# Patient Record
Sex: MALE | Race: OTHER | HISPANIC OR LATINO
[De-identification: names, ages, dates, MRNs, and addresses within clinical notes are randomized per-mention and may not be internally consistent; named-entity substitution may affect disease eponyms.]

---

## 2021-03-22 ENCOUNTER — APPOINTMENT (OUTPATIENT)
Age: 26
End: 2021-03-22
Payer: COMMERCIAL

## 2021-03-22 PROCEDURE — 0001A: CPT

## 2021-04-07 ENCOUNTER — EMERGENCY (EMERGENCY)
Facility: HOSPITAL | Age: 26
LOS: 1 days | Discharge: HOME | End: 2021-04-07
Attending: EMERGENCY MEDICINE
Payer: COMMERCIAL

## 2021-04-07 VITALS
OXYGEN SATURATION: 99 % | SYSTOLIC BLOOD PRESSURE: 125 MMHG | RESPIRATION RATE: 18 BRPM | DIASTOLIC BLOOD PRESSURE: 74 MMHG | HEART RATE: 70 BPM

## 2021-04-07 VITALS
HEIGHT: 71 IN | DIASTOLIC BLOOD PRESSURE: 73 MMHG | WEIGHT: 175.05 LBS | HEART RATE: 82 BPM | TEMPERATURE: 98 F | RESPIRATION RATE: 18 BRPM | OXYGEN SATURATION: 99 % | SYSTOLIC BLOOD PRESSURE: 124 MMHG

## 2021-04-07 PROCEDURE — 99284 EMERGENCY DEPT VISIT MOD MDM: CPT

## 2021-04-07 RX ORDER — CYCLOBENZAPRINE HYDROCHLORIDE 10 MG/1
5 TABLET, FILM COATED ORAL ONCE
Refills: 0 | Status: COMPLETED | OUTPATIENT
Start: 2021-04-07 | End: 2021-04-07

## 2021-04-07 RX ORDER — ACETAMINOPHEN 500 MG
975 TABLET ORAL ONCE
Refills: 0 | Status: COMPLETED | OUTPATIENT
Start: 2021-04-07 | End: 2021-04-07

## 2021-04-07 RX ORDER — IBUPROFEN 200 MG
600 TABLET ORAL ONCE
Refills: 0 | Status: COMPLETED | OUTPATIENT
Start: 2021-04-07 | End: 2021-04-07

## 2021-04-07 RX ORDER — CYCLOBENZAPRINE HYDROCHLORIDE 10 MG/1
1 TABLET, FILM COATED ORAL
Qty: 20 | Refills: 0
Start: 2021-04-07

## 2021-04-07 RX ADMIN — CYCLOBENZAPRINE HYDROCHLORIDE 5 MILLIGRAM(S): 10 TABLET, FILM COATED ORAL at 10:37

## 2021-04-07 RX ADMIN — Medication 975 MILLIGRAM(S): at 10:37

## 2021-04-07 RX ADMIN — Medication 975 MILLIGRAM(S): at 11:00

## 2021-04-07 RX ADMIN — Medication 600 MILLIGRAM(S): at 10:38

## 2021-04-07 RX ADMIN — Medication 600 MILLIGRAM(S): at 11:00

## 2021-04-07 NOTE — ED PROVIDER NOTE - NSFOLLOWUPINSTRUCTIONS_ED_ALL_ED_FT
Tylenol (acetaminophen) two tablets every 4-6 hours or Motrin (Ibuprofen) 2-3 tabs every 6-8 hours if needed.  Apply ice to area 20 minutes on area every 2-4 hours for the next 48-72 hours.  Activity as tolerated  Call [facility] - [phone] for further evaluation and management of your back pain if lasting more than 4-5 days.

## 2021-04-07 NOTE — ED PROVIDER NOTE - PHYSICAL EXAMINATION
Attn - alert, nad, back - no lesions or rashes. tender R para lumbar, no midline tenderness.  SLR neg, abdo -, no CVAT, DTRs +2/4,  =, no clonus or foot drop.  motor 5/5, =

## 2021-04-07 NOTE — ED PROVIDER NOTE - PATIENT PORTAL LINK FT
You can access the FollowMyHealth Patient Portal offered by [facility] by registering at the following website: [email or web address]. By joining [facility]’s FollowMyHealth portal, you will also be able to view your health information using other applications (apps) compatible with our system.

## 2021-04-07 NOTE — ED ADULT NURSE NOTE - OBJECTIVE STATEMENT
26 y/o male Maltese speaking, understands/ speaks some English,  phone provided by MD, pt  c/o right lower back pain after lifting a TV weighing 75 lbs at 3am today.  Pain reports pain is non radiation and he did not take any meds.   Pt reports having pain in his right  lower back last month lasting one day.   Pt denies numbness, tingling, weakness, fever, chills.  Respiration easy and unlabored.  Extremities mobile.  No acute respiratory distress noted.      #[id number]

## 2021-04-07 NOTE — ED PROVIDER NOTE - CLINICAL SUMMARY MEDICAL DECISION MAKING FREE TEXT BOX
Problem: Patient Care Overview (Adult)  Goal: Plan of Care Review  Outcome: Ongoing (interventions implemented as appropriate)    08/14/17 1208   Coping/Psychosocial Response Interventions   Plan Of Care Reviewed With patient   Patient Care Overview   Progress no change   Outcome Evaluation   Outcome Summary/Follow up Plan pt tolerated procedure well.            Attn - back strain in R after lifting 75 lbs TV this am - NSAIDs, Tylenol, flexeril

## 2021-04-07 NOTE — ED PROVIDER NOTE - OBJECTIVE STATEMENT
Attn- pt seen in [facility] - c/o R lower back pain after lifting 75 lbs TV at 3am today.  no radiation. no Red flags for back pain.  did not take any meds.  had pain in R lower back last month that lasted one day. Attn- pt seen in [facility] - c/o R lower back pain after lifting 75 lbs TV at 3am today.  no radiation. no Red flags for back pain.  did not take any meds.  had pain in R lower back last month that lasted one day.   used #[id number]

## 2021-04-08 PROBLEM — Z00.00 ENCOUNTER FOR PREVENTIVE HEALTH EXAMINATION: Status: ACTIVE | Noted: 2021-04-08

## 2021-04-09 PROBLEM — Z78.9 OTHER SPECIFIED HEALTH STATUS: Chronic | Status: ACTIVE | Noted: 2021-04-07

## 2021-04-19 ENCOUNTER — APPOINTMENT (OUTPATIENT)
Dept: POPULATION HEALTH | Facility: CLINIC | Age: 26
End: 2021-04-19
Payer: OTHER MISCELLANEOUS

## 2021-04-19 VITALS
OXYGEN SATURATION: 96 % | HEART RATE: 98 BPM | DIASTOLIC BLOOD PRESSURE: 76 MMHG | BODY MASS INDEX: 41.32 KG/M2 | SYSTOLIC BLOOD PRESSURE: 130 MMHG | WEIGHT: 279 LBS | RESPIRATION RATE: 16 BRPM | TEMPERATURE: 98.6 F | HEIGHT: 69 IN

## 2021-04-19 DIAGNOSIS — S39.012A STRAIN OF MUSCLE, FASCIA AND TENDON OF LOWER BACK, INITIAL ENCOUNTER: ICD-10-CM

## 2021-04-19 DIAGNOSIS — S90.31XA CONTUSION OF RIGHT FOOT, INITIAL ENCOUNTER: ICD-10-CM

## 2021-04-19 DIAGNOSIS — Z78.9 OTHER SPECIFIED HEALTH STATUS: ICD-10-CM

## 2021-04-19 PROCEDURE — 99202 OFFICE O/P NEW SF 15 MIN: CPT

## 2021-04-19 PROCEDURE — 99072 ADDL SUPL MATRL&STAF TM PHE: CPT

## 2021-04-21 NOTE — PHYSICAL EXAM
[General Appearance - Alert] : alert [General Appearance - In No Acute Distress] : in no acute distress [General Appearance - Well Nourished] : well nourished [General Appearance - Well Developed] : well developed [General Appearance - Well-Appearing] : healthy appearing [Neck Appearance] : the appearance of the neck was normal [Neck Cervical Mass (___cm)] : no neck mass was observed [Jugular Venous Distention Increased] : there was no jugular-venous distention [Respiration, Rhythm And Depth] : normal respiratory rhythm and effort [Exaggerated Use Of Accessory Muscles For Inspiration] : no accessory muscle use [Heart Rate And Rhythm] : heart rate was normal and rhythm regular [Abnormal Walk] : normal gait [Skin Color & Pigmentation] : normal skin color and pigmentation [Skin Turgor] : normal skin turgor [] : no rash [Skin Lesions] : no skin lesions [Deep Tendon Reflexes (DTR)] : deep tendon reflexes were 2+ and symmetric [Sensation] : the sensory exam was normal to light touch and pinprick [Motor Exam] : the motor exam was normal [Oriented To Time, Place, And Person] : oriented to person, place, and time [Impaired Insight] : insight and judgment were intact [Affect] : the affect was normal [Mood] : the mood was normal [FreeTextEntry1] : Shoulders: FROM w/o pain, no ttp.  No swelling, bruising, or bony prominence appreciated.

## 2021-04-21 NOTE — REVIEW OF SYSTEMS
[As Noted in HPI] : as noted in HPI [Negative] : Neurological [Fever] : no fever [Chills] : no chills

## 2021-04-21 NOTE — ASSESSMENT
[Indicate if, in your opinion, the incident that the patient described was the competent medical cause of this injury/illness.] : The incident that the patient described was the competent medical cause of this injury/illness: Yes [Indicate if the patient's complaints are consistent with his/her history of the injury/illness.] : Indicate if the patient's complaints are consistent with his/her history of the injury/illness: Yes [Yes] : Yes, it is consistent [Are there any work limitations? (If so, explain and quantify, including the anticipated duration of the limitations)] : There are work limitations. [Can the patient return to usual work activities as indicated? If yes, indicate date___] : The patient cannot return to usual work activities as indicated. [FreeTextEntry5] : 50 [FreeTextEntry6] : Pt continues to have low back pain with aggravated motions of the back and with lifting, less pain with rest.  Pt has yet to initiate prescribed medications, one of which can cause potential drowsiness, on a consistent basis.   [FreeTextEntry7] : Suggested no lifting > 15 lbs for 2-4 weeks then re-evaluate.

## 2021-04-21 NOTE — PLAN
[FreeTextEntry1] : -Discussed injury and potential course of natural recovery.\par -Initiate cyclobenzaprine 5mg tabs, take 2 tabs at bedtime prn muscle spasm.  Discussed medication indication, CI, and potential SEs.  Precautions given and discussed in not taking this medication before and while operating heavy machinery.\par -Ibuprofen 200mg tabs, take 3 tabs by mouth every 6 hours prn pain.  Discussed medication indication, CI, and potential SEs.  \par -Tylenol 500mg tabs, take 2 tabs by mouth twice daily prn pain.  Discussed medication indication, CI, and potential SEs.  \par -May apply cold/hot compress to back as for comfort.\par -Discussed work status.  Off work today and tomorrow to initiate medications.  Return to work on 4/21/2021 with restriction of no lifting > 15 lbs, no extensive bending/twisting, and no prolonged sitting/standing for more than 2 hours at a time.\par -Precautions given as to activity that may aggravate injury outside of work.\par -Offered physical therapy.  Pt declines at this time.\par -RTC in 1 month or sooner if symptoms worsen/improve.  \par -Pt verbalizes understanding and is in agreement with plan of care as outlined above.

## 2021-04-21 NOTE — HISTORY OF PRESENT ILLNESS
[Has the patient missed work because of the injury/illness?] : The patient has missed work because of the injury/illness. [FreeTextEntry1] : 25 y.o. 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  He lifted a 75" television off a 2-pallet height when the TV fell onto his right foot.  About 5 minutes after the incident, he developed right lower back pain.  He denies falling.  He took a 15-minute break and stated that his pain in right foot and back resolved.  However, pain started again in his back when he resumed working.  He was seen in the ED on the same day, no imaging was performed as per pt, given acetaminophen 975mg, ibuprofen 600mg, and cyclobenzaprine 5mg in ED, and discharged with Rx for cyclopenzaprine 5mg and a note to return to work on 4/12/2021.  Since ED discharge, he has not taken further doses of cyclobenzaprine and has been taking Tylenol intermittently with some relief.  He states that he did not work on 4/12/2021 as he received his second dose of COVID-19 vaccine.  He worked on 4/13/2021 but felt nauseated secondary to the vaccine.  He states he did not work until 4/17/2021 and 4/18/2021 secondary to not feeling well from the vaccine.  He states that his back pain improved when he was off work.  He was able to complete his work duties without incident but felt increased low back pain on 4/18/2021 that he attributes to lifting and using his back at work.  He states he called out today due to back pain.  He denies direct trauma, b/b incontinence, numbness, tingling, CP, SOB, and factors outside of work that may have attributed to his present complaints of back pain.  He states his back pain is aggravated with lifting, pain less at rest.  He denies previous back injury.  He states his right foot pain resolved.  He offers no other complaints today. [FreeTextEntry2] : [employer] lonny [FreeTextEntry3] : Heavy lifting [FreeTextEntry4] : He was evaluated and treated in [facility] ED on 4/7/2021 and prescribed cyclobenzaprine 5mg tabs. [FreeTextEntry5] : None reported by pt. [FreeTextEntry6] : 04/08/2021 [FreeTextEntry7] : Pt missed work not related to his WC injury, returned to work on 4/17 and 4/18, and currently not working.

## 2021-05-06 ENCOUNTER — APPOINTMENT (OUTPATIENT)
Dept: POPULATION HEALTH | Facility: CLINIC | Age: 26
End: 2021-05-06
Payer: OTHER MISCELLANEOUS

## 2021-05-06 VITALS
OXYGEN SATURATION: 95 % | TEMPERATURE: 98.2 F | BODY MASS INDEX: 42.51 KG/M2 | SYSTOLIC BLOOD PRESSURE: 150 MMHG | HEART RATE: 88 BPM | RESPIRATION RATE: 17 BRPM | HEIGHT: 69 IN | WEIGHT: 287 LBS | DIASTOLIC BLOOD PRESSURE: 87 MMHG

## 2021-05-06 DIAGNOSIS — S39.012D STRAIN OF MUSCLE, FASCIA AND TENDON OF LOWER BACK, SUBSEQUENT ENCOUNTER: ICD-10-CM

## 2021-05-06 DIAGNOSIS — M62.838 OTHER MUSCLE SPASM: ICD-10-CM

## 2021-05-06 DIAGNOSIS — S90.31XD CONTUSION OF RIGHT FOOT, SUBSEQUENT ENCOUNTER: ICD-10-CM

## 2021-05-06 PROCEDURE — 99072 ADDL SUPL MATRL&STAF TM PHE: CPT

## 2021-05-06 PROCEDURE — 99213 OFFICE O/P EST LOW 20 MIN: CPT

## 2021-05-10 NOTE — PLAN
[FreeTextEntry1] : -Based on pt's history and today's PE, his c/o right foot contusion, lumbosacral strain, and muscle spasms are mostly resolved.\par -Discussed medication use.  May discontinue cyclobenzaprine, ibuprofen, and Tylenol if no further pain, muscle spasms, or discomfort.\par -Discussed work status.  May resume full duty.\par -RTC prn.\par -Pt verbalizes understanding and is in agreement with plan of care as outlined above. [FreeTextEntry2] : 05/06/2021

## 2021-05-10 NOTE — PHYSICAL EXAM
[General Appearance - Alert] : alert [General Appearance - In No Acute Distress] : in no acute distress [General Appearance - Well Nourished] : well nourished [General Appearance - Well Developed] : well developed [General Appearance - Well-Appearing] : healthy appearing [Neck Appearance] : the appearance of the neck was normal [Neck Cervical Mass (___cm)] : no neck mass was observed [No CVA Tenderness] : no ~M costovertebral angle tenderness [No Spinal Tenderness] : no spinal tenderness [Abnormal Walk] : normal gait [Skin Color & Pigmentation] : normal skin color and pigmentation [Skin Turgor] : normal skin turgor [] : no rash [Skin Lesions] : no skin lesions [Deep Tendon Reflexes (DTR)] : deep tendon reflexes were 2+ and symmetric [Sensation] : the sensory exam was normal to light touch and pinprick [Motor Exam] : the motor exam was normal [Oriented To Time, Place, And Person] : oriented to person, place, and time [Impaired Insight] : insight and judgment were intact [FreeTextEntry1] : SLR negativew.  No numbness, tingling elicited.

## 2021-05-10 NOTE — HISTORY OF PRESENT ILLNESS
[FreeTextEntry1] : 25 y.o. 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  He reported right foot pain and right lower back pain.  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  He states he did not work from April 27 to May 2, 2021 and worked on May 3, 2021.  The last time he took medications was on May 1, 2021 and has not required further medication as he feels that his pain and discomfort have resolved.  He rates his pain as 0/10 today and denies numbness, tingling, CP, SOB, b/b incontinence, and any new complaints not previously reported.  He thinks he can resume full duty.  His next scheduled shift is on May  8, 2021.  He offers no other complaints today.

## 2022-07-29 NOTE — ED PROVIDER NOTE - DOMESTIC TRAVEL HIGH RISK QUESTION
Refill request received from [facility]  for levothyroxine. Script refilled per protocol.    Last Lab TSH: 06/28/2022    LOV 07/01/2022  FOV 09/27/2022   No